# Patient Record
Sex: FEMALE | Race: WHITE | NOT HISPANIC OR LATINO | URBAN - METROPOLITAN AREA
[De-identification: names, ages, dates, MRNs, and addresses within clinical notes are randomized per-mention and may not be internally consistent; named-entity substitution may affect disease eponyms.]

---

## 2017-11-03 ENCOUNTER — OFFICE (OUTPATIENT)
Dept: URBAN - METROPOLITAN AREA CLINIC 11 | Facility: CLINIC | Age: 57
End: 2017-11-03

## 2017-11-03 VITALS
HEIGHT: 64 IN | DIASTOLIC BLOOD PRESSURE: 49 MMHG | WEIGHT: 129 LBS | SYSTOLIC BLOOD PRESSURE: 112 MMHG | HEART RATE: 87 BPM

## 2017-11-03 DIAGNOSIS — R13.19 OTHER DYSPHAGIA: ICD-10-CM

## 2017-11-03 DIAGNOSIS — K21.9 GASTRO-ESOPHAGEAL REFLUX DISEASE WITHOUT ESOPHAGITIS: ICD-10-CM

## 2017-11-03 DIAGNOSIS — R11.2 NAUSEA WITH VOMITING, UNSPECIFIED: ICD-10-CM

## 2017-11-03 PROCEDURE — 99214 OFFICE O/P EST MOD 30 MIN: CPT | Performed by: INTERNAL MEDICINE

## 2017-11-03 RX ORDER — SODIUM SULFATE, POTASSIUM SULFATE, MAGNESIUM SULFATE 17.5; 3.13; 1.6 G/ML; G/ML; G/ML
SOLUTION, CONCENTRATE ORAL
Qty: 1 | Refills: 0 | Status: COMPLETED
Start: 2017-11-03 | End: 2018-02-20

## 2017-11-03 NOTE — SERVICENOTES
We dsicussed her colon cancer screening and the need for colonsocopy, especially with the history of diverticulitis and stooling issues.  We discussed her chronic GERD, NSAID use, and dyshpagia.  She will need an eval by EGD and possibly a dilation.  We dsicussed the r/b/a to the procedures and she ageed to proceed.  We discussed a dif dx of her issues and also the need for complaince with her meds (though she does not like to take anything consistently).

## 2017-11-03 NOTE — SERVICEHPINOTES
"I've had a lot going on which lead to having headaches."  She stated that she has had migraines with sinus headaaches which causes her to have vomiting issues about once a week.  She was seen by a neurologist two years ago for this issue.  She started eating better and taking magnesium and B12.  She also started eating a higher fiber diet.  "I think that it was making my stomach work to fast."  She was having 2-3 stools a day.  She now thinks that this caused her issues with diverticulitis last year.  She was seen in clinic at that time and was treated with antibiotics.   She did well for several months.  She stated she had another issue with abdominal pain in July and thought it was diverticulitis.  She was not seen then.  It resolved after several days.  This week she had headaches again on Tuesday "due to the front (weather)".   She took ibuprofen and had vomiting shortly afterward and then for the next day.  Since Tuesday, she has felt "crappy". She has had nuasea but not vomiting today.  She stated her appetite is returning. She does have a history of GERD since the age of 16. She has been on a reflux diet and avoids caffiene and coffee in particular.  Over the last year, she has had a feeling of squeezing in her chest with the smell of some foods, especially [employer] fries.  Sometimes she will esophageal pain with eating.  She has had dysphagia with breads (mid chest).  BR

## 2018-02-06 ENCOUNTER — INPATIENT HOSPITAL (OUTPATIENT)
Dept: URBAN - METROPOLITAN AREA HOSPITAL 95 | Facility: HOSPITAL | Age: 58
End: 2018-02-06

## 2018-02-06 DIAGNOSIS — R10.13 EPIGASTRIC PAIN: ICD-10-CM

## 2018-02-06 DIAGNOSIS — K52.9 NONINFECTIVE GASTROENTERITIS AND COLITIS, UNSPECIFIED: ICD-10-CM

## 2018-02-06 DIAGNOSIS — R11.2 NAUSEA WITH VOMITING, UNSPECIFIED: ICD-10-CM

## 2018-02-06 DIAGNOSIS — R10.84 GENERALIZED ABDOMINAL PAIN: ICD-10-CM

## 2018-02-06 PROCEDURE — 99254 IP/OBS CNSLTJ NEW/EST MOD 60: CPT | Performed by: INTERNAL MEDICINE

## 2018-02-07 PROCEDURE — 99231 SBSQ HOSP IP/OBS SF/LOW 25: CPT | Performed by: INTERNAL MEDICINE

## 2018-02-08 PROCEDURE — 99231 SBSQ HOSP IP/OBS SF/LOW 25: CPT | Performed by: INTERNAL MEDICINE

## 2018-02-20 ENCOUNTER — OFFICE (OUTPATIENT)
Dept: URBAN - METROPOLITAN AREA CLINIC 11 | Facility: CLINIC | Age: 58
End: 2018-02-20

## 2018-02-20 VITALS
WEIGHT: 126 LBS | RESPIRATION RATE: 16 BRPM | SYSTOLIC BLOOD PRESSURE: 99 MMHG | DIASTOLIC BLOOD PRESSURE: 48 MMHG | HEIGHT: 64 IN | HEART RATE: 73 BPM

## 2018-02-20 DIAGNOSIS — R19.7 DIARRHEA, UNSPECIFIED: ICD-10-CM

## 2018-02-20 DIAGNOSIS — R93.3 ABNORMAL FINDINGS ON DIAGNOSTIC IMAGING OF OTHER PARTS OF DI: ICD-10-CM

## 2018-02-20 DIAGNOSIS — R11.2 NAUSEA WITH VOMITING, UNSPECIFIED: ICD-10-CM

## 2018-02-20 LAB
ALPHA-GAL PANEL: ALPHA GAL IGE*: 0.24 KU/L (ref ?–0.35)
ALPHA-GAL PANEL: BEEF (BOS SPP) IGE: <0.1 KU/L
ALPHA-GAL PANEL: CLASS INTERPRETATION: 0
ALPHA-GAL PANEL: LAMB/MUTTON (OVIS SPP) IGE: <0.1 KU/L
ALPHA-GAL PANEL: PORK (SUS SPP) IGE: <0.1 KU/L
AMYLASE, SERUM: 131 U/L — HIGH (ref 31–124)
COMP. METABOLIC PANEL (14): A/G RATIO: 1.6 (ref 1.2–2.2)
COMP. METABOLIC PANEL (14): ALBUMIN, SERUM: 4.2 G/DL (ref 3.5–5.5)
COMP. METABOLIC PANEL (14): ALKALINE PHOSPHATASE, S: 91 IU/L (ref 39–117)
COMP. METABOLIC PANEL (14): ALT (SGPT): 28 IU/L (ref 0–32)
COMP. METABOLIC PANEL (14): AST (SGOT): 19 IU/L (ref 0–40)
COMP. METABOLIC PANEL (14): BILIRUBIN, TOTAL: 0.4 MG/DL (ref 0–1.2)
COMP. METABOLIC PANEL (14): BUN/CREATININE RATIO: 15 (ref 9–23)
COMP. METABOLIC PANEL (14): BUN: 12 MG/DL (ref 6–24)
COMP. METABOLIC PANEL (14): CALCIUM, SERUM: 9.7 MG/DL (ref 8.7–10.2)
COMP. METABOLIC PANEL (14): CARBON DIOXIDE, TOTAL: 27 MMOL/L (ref 18–29)
COMP. METABOLIC PANEL (14): CHLORIDE, SERUM: 101 MMOL/L (ref 96–106)
COMP. METABOLIC PANEL (14): CREATININE, SERUM: 0.79 MG/DL (ref 0.57–1)
COMP. METABOLIC PANEL (14): EGFR IF AFRICN AM: 97
COMP. METABOLIC PANEL (14): EGFR IF NONAFRICN AM: 85
COMP. METABOLIC PANEL (14): GLOBULIN, TOTAL: 2.6 (ref 1.5–4.5)
COMP. METABOLIC PANEL (14): GLUCOSE, SERUM: 84 MG/DL (ref 65–99)
COMP. METABOLIC PANEL (14): POTASSIUM, SERUM: 4.6 MMOL/L (ref 3.5–5.2)
COMP. METABOLIC PANEL (14): PROTEIN, TOTAL, SERUM: 6.8 G/DL (ref 6–8.5)
COMP. METABOLIC PANEL (14): SODIUM, SERUM: 141 MMOL/L (ref 134–144)
LIPASE, SERUM: 258 U/L — HIGH (ref 14–72)

## 2018-02-20 PROCEDURE — 99214 OFFICE O/P EST MOD 30 MIN: CPT | Performed by: INTERNAL MEDICINE

## 2018-02-20 RX ORDER — POLYETHYLENE GLYCOL 3350, SODIUM SULFATE, SODIUM CHLORIDE, POTASSIUM CHLORIDE, ASCORBIC ACID, SODIUM ASCORBATE 7.5-2.691G
KIT ORAL
Qty: 1 | Refills: 0 | Status: ACTIVE
Start: 2018-02-20

## 2018-02-20 NOTE — SERVICENOTES
We discussed her long hx of n/v and intermittent diarrhea as well as the more recent CT findings and labs.  Her sx have improved somewhat with the Augumentin but with the chronic n/v, abdominal pain, and CT findings, I would like to do the EGD and colonoscopy (which has been scheduled twice before and she has not followed through) as discussed with pt in great detail including risks/benefits/expectations/sedation.  I will add beef allergy testing as well noting her reported issues with beef.  We discussed the need to broaden her diet.

## 2018-02-20 NOTE — SERVICEHPINOTES
Two weeks ago she stated that she was awakened with an intense pain and dripping sweat.  She stated that the pain was in her mid abdomen and moved to her back.  She thought that she was having a heart attack.  She was seen at [facility] and transfered to [place].  She had a cath that was normal and had recurrent n/v thouhgout it.  She stated that she was told that she had an elevated Lipase. She thinks that eating a hamburger caused ehr issues.  She stated that now she is eating soups (chicken) and making smoothies of "everything".  She c/o that she is able to eat food but is hungry after about 2 hour after eating.  She has used lots of dried fruit, nuts, and vegetables.  She has noted particular issues with beef over time.  She has stopped her lactose and thinks that this has helped.  She has substituted with almond milk. She has also thought that taking antiboitics while inpt have helped (it was Augmentin) Her chart in [facility] was noted.  Her CT revealed a mild thickening of the colon.  Her pancreas was and the CT was otherwise negative.  Her lipase was elevated to 3000.  Her LFTS were overall normal.  D/w pt the results.